# Patient Record
Sex: FEMALE | Race: WHITE | Employment: FULL TIME | URBAN - METROPOLITAN AREA
[De-identification: names, ages, dates, MRNs, and addresses within clinical notes are randomized per-mention and may not be internally consistent; named-entity substitution may affect disease eponyms.]

---

## 2017-09-26 ENCOUNTER — HOSPITAL ENCOUNTER (EMERGENCY)
Age: 37
Discharge: HOME | End: 2017-09-26
Attending: EMERGENCY MEDICINE
Payer: SELF-PAY

## 2017-09-26 VITALS
DIASTOLIC BLOOD PRESSURE: 102 MMHG | HEART RATE: 103 BPM | BODY MASS INDEX: 31.39 KG/M2 | WEIGHT: 200 LBS | HEIGHT: 67 IN | SYSTOLIC BLOOD PRESSURE: 168 MMHG | TEMPERATURE: 98.4 F | RESPIRATION RATE: 17 BRPM | OXYGEN SATURATION: 99 %

## 2017-09-26 DIAGNOSIS — J34.0 ABSCESS OF NASAL SEPTUM: Primary | ICD-10-CM

## 2017-09-26 PROCEDURE — 87205 SMEAR GRAM STAIN: CPT | Performed by: EMERGENCY MEDICINE

## 2017-09-26 PROCEDURE — 99282 EMERGENCY DEPT VISIT SF MDM: CPT | Performed by: EMERGENCY MEDICINE

## 2017-09-26 RX ORDER — CLINDAMYCIN HYDROCHLORIDE 150 MG/1
150 CAPSULE ORAL 4 TIMES DAILY
Qty: 28 CAP | Refills: 0 | Status: SHIPPED | OUTPATIENT
Start: 2017-09-26 | End: 2017-10-03

## 2017-09-26 NOTE — ED TRIAGE NOTES
Patient reports swelling and drainage of puss/blood from left side of nose. Swelling began yesterday morning.

## 2017-09-26 NOTE — ED PROVIDER NOTES
Patient is a 29 y.o. female presenting with skin problem. The history is provided by the patient. Skin Problem    This is a new problem. The current episode started 2 days ago. The problem has been rapidly improving. Associated with: denies snorting of drugs or injury. Draining this morning. There has been no fever. Affected Location: left nasal septum just inside nose. The pain is moderate. The pain has been constant since onset. Associated symptoms include pain and weeping. She has tried nothing for the symptoms. History reviewed. No pertinent past medical history. Past Surgical History:   Procedure Laterality Date    HX GYN      c section         History reviewed. No pertinent family history. Social History     Social History    Marital status: SINGLE     Spouse name: N/A    Number of children: N/A    Years of education: N/A     Occupational History    Not on file. Social History Main Topics    Smoking status: Current Every Day Smoker     Packs/day: 1.00    Smokeless tobacco: Not on file    Alcohol use Yes      Comment: occ    Drug use: No    Sexual activity: Not on file     Other Topics Concern    Not on file     Social History Narrative         ALLERGIES: Codeine    Review of Systems   Constitutional: Negative for fever. HENT: Positive for congestion and sinus pain. Vitals:    09/26/17 0811   BP: (!) 168/102   Pulse: (!) 103   Resp: 17   Temp: 98.4 °F (36.9 °C)   SpO2: 99%   Weight: 90.7 kg (200 lb)   Height: 5' 7\" (1.702 m)            Physical Exam   Constitutional: She appears well-developed and well-nourished. HENT:   Nose: Mucosal edema and sinus tenderness present. Nursing note and vitals reviewed. MDM  Number of Diagnoses or Management Options  Diagnosis management comments: Intranasal abscess with spontaneous drainage.    Patient encouraged to continue gentle pressure to promote drainage as well as keep the area clean with a Q-tip with antibacterial soap and water and apply antibiotic ointment. We'll prescribe antibiotics. Culture of the area will be performed.        Amount and/or Complexity of Data Reviewed  Clinical lab tests: ordered      ED Course       Procedures

## 2017-09-26 NOTE — LETTER
NOTIFICATION RETURN TO WORK / SCHOOL 
 
9/26/2017 8:51 AM 
 
Ms. Tricia Conklin [address] [place] [address] [address] [facility] [phone] To Whom It May Concern: Tricia Conklin is currently under the care of [facility] EMERGENCY DEPT. She will return to work/school on: Wednesday 9/27/2017 If there are questions or concerns please have the patient contact our office. Sincerely, No name on file.

## 2017-09-28 LAB
BACTERIA SPEC CULT: NORMAL
GRAM STN SPEC: NORMAL
GRAM STN SPEC: NORMAL
SERVICE CMNT-IMP: NORMAL